# Patient Record
Sex: FEMALE | Race: WHITE
[De-identification: names, ages, dates, MRNs, and addresses within clinical notes are randomized per-mention and may not be internally consistent; named-entity substitution may affect disease eponyms.]

---

## 2019-03-16 ENCOUNTER — HOSPITAL ENCOUNTER (EMERGENCY)
Dept: HOSPITAL 58 - ED | Age: 59
Discharge: HOME | End: 2019-03-16

## 2019-03-16 VITALS — DIASTOLIC BLOOD PRESSURE: 92 MMHG | SYSTOLIC BLOOD PRESSURE: 143 MMHG

## 2019-03-16 VITALS — TEMPERATURE: 98.8 F

## 2019-03-16 VITALS — BODY MASS INDEX: 37.6 KG/M2

## 2019-03-16 DIAGNOSIS — Z79.899: ICD-10-CM

## 2019-03-16 DIAGNOSIS — J40: Primary | ICD-10-CM

## 2019-03-16 DIAGNOSIS — I10: ICD-10-CM

## 2019-03-16 PROCEDURE — 84443 ASSAY THYROID STIM HORMONE: CPT

## 2019-03-16 PROCEDURE — 36415 COLL VENOUS BLD VENIPUNCTURE: CPT

## 2019-03-16 PROCEDURE — 85025 COMPLETE CBC W/AUTO DIFF WBC: CPT

## 2019-03-16 PROCEDURE — 93005 ELECTROCARDIOGRAM TRACING: CPT

## 2019-03-16 PROCEDURE — 94640 AIRWAY INHALATION TREATMENT: CPT

## 2019-03-16 PROCEDURE — 93010 ELECTROCARDIOGRAM REPORT: CPT

## 2019-03-16 PROCEDURE — 99283 EMERGENCY DEPT VISIT LOW MDM: CPT

## 2019-03-16 NOTE — ED.PDOC
General


ED Provider: 


Dr. NILA COY





Chief Complaint: Respiratory Complaint


Stated Complaint: cough and wheezing seven days now.Called doctor placed on 

prednisone and augmentin.No relief.Contact with coughing children.Two "leaky" 

heart valves.  Hypertension on Lisinopril.


Time Seen by Physician: 07:25


Mode of Arrival: Walk-In


Information Source: Patient


Exam Limitations: No limitations


Primary Care Provider: 


PB [facility]





Nursing and Triage Documentation Reviewed and Agree: Yes


Does patient meet sepsis criteria?: No


System Inflammatory Response Syndrome: Not Applicable


Sepsis Protocol: 


For patient's 13 years and over:





Temp is 96.8 and below  and greater


Pulse >90 BPM


Resp >20/minute


Acutely Altered Mental Status





Are patient's symptoms suggestive of a new infection, such as:


   -Pneumonia


   -Skin, Soft Tissue


   -Endocarditis


   -UTI


   -Bone, Joint Infection


   -Implantable Device


   -Acute Abdominal Infection


   -Wound Infection


   -Meningitis


   -Blood Stream Catheter Infection


   -Unknown








Respiratory Complaint Exam





- Respiratory Complaint/Exam


Onset/Duration: seven days now


Symptoms Are: Still present


Timing: Constant


Initial Severity: Moderate


Current Severity: Moderate


Location: Chest


Character: Reports: Non-productive cough


Aggravating: Reports: URI


Alleviating: Reports: None


Associated Signs and Symptoms: Reports: Chills, Wheezing


Related History: Reports: Similar episode


History of Healthcare-Acquired Pneumonia: No


Related Surgical History: Reports: None


Pulmonary Embolism Risk Factors: None


Cardiac Risk Factors: Reports: Hypertension


Pseudomonas Risk Factors: Reports: None


Tuberculosis Risk Factors: Reports: None


Status Asthmaticus Risk Factors: Reports: None


Home Oxygen Use: No


Recent Stress Test: No


Recent Echo/LV Function: No


Current Antibiotic Use: Yes (on augmentin)


Current Asthma Medication Use: Yes (started Proventil at home no effect)


Respiratory Distress: None


Dysphagia Present: No


Stridor Present: No


JVD Present: No


Accessory Muscle Use: No


Retractions: Not Present


Diminished Breath Sounds: Yes (bilateral)


Sinus Tenderness: None


Grunting Respirations: No


Kussmaul Respirations: No


Differential Diagnoses: Asthma, Pneumonia, Lower Resp. Infection





Review of Systems





- Review Of Systems


Constitutional: Reports: Other


Eyes: Reports: No symptoms


Ears, Nose, Mouth, Throat: Reports: No symptoms


Respiratory: Reports: Cough, Wheezing


Cardiac: Reports: No symptoms


GI: Reports: No symptoms


: Reports: No symptoms


Musculoskeletal: Reports: No symptoms


Neurological: Reports: No symptoms


Endocrine: Reports: No symptoms


Hematologic/Lymphatic: Reports: No symptoms


All Other Systems: Reviewed and Negative





Past Medical History





- Past Medical History


Previously Healthy: Yes (no respiratory but HTN and heart valves)


Endocrine: Reports: None


Cardiovascular: Reports: Hypertension, Other


Respiratory: Reports: None, Other


Hematological: Reports: None


Gastrointestinal: Reports: None


Genitourinary: Reports: None


Neuro/Psych: Reports: None


Musculoskeletal: Reports: None


Cancer: Reports: None


Last Menstrual Period: menopause





- Surgical History


General Surgical History: Reports: None





- Family History


Family History: Reports: None





- Social History


Smoking Status: Former smoker


Hx Substance Use: No


Alcohol Screening: None





- Immunizations


Tetanus Shot up to Date: No


Influenza Vaccine within 12 Months: No


Pneumococcal Vaccine up to Date: No





Physical Exam





- Physical Exam


Appearance: Well-appearing


Ill-appearing: None


Pain Distress: None


Eyes: HESHAM


ENT: Ears normal


Neck: Supple


Respiratory: Breath sounds diminished


Cardiovascular: RRR


Musculoskeletal: Normal strength


Skin: Warm


Neurological: Sensation intact





Critical Care Note





- Critical Care Note


Total Time (mins): 0





Course





- Course


Hematology/Chemistry: 


 03/16/19 08:40





Orders, Labs, Meds: 


Lab Review











  03/16/19 03/16/19





  08:40 08:40


 


WBC  11.19 H 


 


RBC  4.76 


 


Hgb  14.2 


 


Hct  42.8 


 


MCV  89.9 


 


MCH  29.8 


 


MCHC  33.2 


 


RDW Coeff of Linda  12.7 


 


Plt Count  280 


 


Immature Gran % (Auto)  0.6 


 


Neut % (Auto)  56.7 


 


Lymph % (Auto)  33.8 


 


Mono % (Auto)  8.2 


 


Eos % (Auto)  0.3 


 


Baso % (Auto)  0.4 


 


Immature Gran # (Auto)  0.1 


 


Neut # (Auto)  6.4 


 


Lymph # (Auto)  3.8 H 


 


Mono # (Auto)  0.9 


 


Eos # (Auto)  0.0 


 


Baso # (Auto)  0.0 


 


TSH   6.120 H








Orders











 Category Date Time Status


 


 EKG-(ED ONLY) Stat CARDIO  03/16/19 08:05 Completed


 


 NEBULIZER TREATMENT Stat CARDIO  03/16/19 08:04 Completed


 


 NEBULIZER TREATMENT Stat CARDIO  03/16/19 09:27 Ordered


 


 Vital signs [ED VITAL SIGNS] .ONCE EMERGENCY  03/16/19 08:12 Active


 


 CBC W/ AUTO DIFF Stat LAB  03/16/19 08:40 Completed


 


 TSH [THYROID STIMULATING HORMONE] Stat LAB  03/16/19 08:40 Completed


 


 Albuterol Sulfate 0.083% Neb [Albuterol 0.083% Neb] MEDS  03/16/19 08:02 

Discontinued





 1 vial NEB ONCE STA   


 


 Albuterol Sulfate 0.083% Neb [Albuterol 0.083% Neb] MEDS  03/16/19 09:26 Stat





 1 vial NEB ONCE STA   


 


 Clonidine HCl [Catapres] MEDS  03/16/19 08:24 Discontinued





 0.1 mg PO ONCE STA   


 


 CHEST, 2 VIEWS PA & LAT Stat RADS  03/16/19 08:01 Completed








Medications














Discontinued Medications














Generic Name Dose Route Start Last Admin





  Trade Name Kpq  PRN Reason Stop Dose Admin


 


Albuterol Sulfate  1 vial  03/16/19 08:02  03/16/19 08:12





  Albuterol 0.083% Neb  NEB  03/16/19 08:03  1 vial





  ONCE STA   Administration





     





     





     





     


 


Albuterol Sulfate  1 vial  03/16/19 09:26  03/16/19 09:37





  Albuterol 0.083% Neb  NEB  03/16/19 09:27  1 vial





  ONCE STA   Administration





     





     





     





     


 


Clonidine  0.1 mg  03/16/19 08:24  03/16/19 08:44





  Catapres  PO  03/16/19 08:25  0.1 mg





  ONCE STA   Administration





     





     





     





     











Vital Signs: 


 











  Temp Pulse Resp BP Pulse Ox


 


 03/16/19 09:15     143/92 H 


 


 03/16/19 08:37     151/110 H 


 


 03/16/19 07:19  98.8 F  89  16  176/123 H  96














Departure





- Departure


Time of Disposition: 09:46


Disposition: HOME SELF-CARE


Discharge Problem: 


 Wheezing, Bronchitis





Instructions:  Reactive Airways Disease (ED)


Condition: Good


Pt referred to PMD for follow-up: Yes


IPMP verified?: No


Additional Instructions: 


It was noted that  blood pressure was elevated at the outset 173/110 and began 

optimizing at 146.89 towards the end of the  ED visit and after 0.i of 

Clonidine.TSH was 6.125.Patiwent may e taking an insufficieyt does of 

Levothyroximn 0.78 mcg.She will can her doctoe for adjustement,


Allergies/Adverse Reactions: 


Allergies





ciprofloxacin [From Cipro] Adverse Reaction (Verified 03/16/19 07:29)


 


Iodinated Contrast- Oral and IV Dye Adverse Reaction (Verified 03/16/19 07:29)


 








Home Medications: 


Ambulatory Orders





Albuterol Sulfate [Ventolin Hfa] 18 gm IH PRN PRN 03/16/19 


Amoxicillin/Potassium Clav [Augmentin 875-125 mg Tab] 1 tab PO Q12HR 03/16/19 


Ibuprofen/Famotidine [Duexis 800-26.6 mg Tablet] 1 each PO BID 03/16/19 


Levothyroxine Sodium [Synthroid] 75 mcg PO QDAC 03/16/19 


Lisinopril 2.5 mg PO DAILY 03/16/19 


Pravastatin Sodium [Pravachol] 40 mg PO BEDTIME 03/16/19 


Prednisone 20 mg PO DAILY 03/16/19 








Disposition Discussed With: Patient, Family

## 2019-03-16 NOTE — DI
Exam:  Two-view chest x-ray. 

  

Date:  03/16/2019. 

  

Comparison:  09/12/2017. 

  

  

HISTORY:  Cough. 

  

FINDINGS:  No acute osseous abnormalities are seen and surgical clips are present overlying the lower
 right thorax.  The lungs are clear with calcified granulomas.  Cardiac silhouette and pulmonary vasc
ulature are normal. 

  

Impression:  No acute intrathoracic findings.